# Patient Record
Sex: FEMALE | Employment: FULL TIME | URBAN - METROPOLITAN AREA
[De-identification: names, ages, dates, MRNs, and addresses within clinical notes are randomized per-mention and may not be internally consistent; named-entity substitution may affect disease eponyms.]

---

## 2022-03-18 PROBLEM — Z01.419 ENCOUNTER FOR ANNUAL ROUTINE GYNECOLOGICAL EXAMINATION: Status: ACTIVE | Noted: 2020-09-09

## 2022-03-19 PROBLEM — N92.6 IRREGULAR MENSES: Status: ACTIVE | Noted: 2021-04-07

## 2022-03-20 PROBLEM — B00.9 HERPES SIMPLEX VIRUS TYPE 2 (HSV-2) INFECTION AFFECTING PREGNANCY IN FIRST TRIMESTER: Status: ACTIVE | Noted: 2020-02-26

## 2022-03-20 PROBLEM — O98.511 HERPES SIMPLEX VIRUS TYPE 2 (HSV-2) INFECTION AFFECTING PREGNANCY IN FIRST TRIMESTER: Status: ACTIVE | Noted: 2020-02-26

## 2022-03-20 PROBLEM — R87.810 ASCUS WITH POSITIVE HIGH RISK HPV CERVICAL: Status: ACTIVE | Noted: 2020-02-26

## 2022-03-20 PROBLEM — R87.610 ASCUS WITH POSITIVE HIGH RISK HPV CERVICAL: Status: ACTIVE | Noted: 2020-02-26

## 2022-06-16 ENCOUNTER — OFFICE VISIT (OUTPATIENT)
Dept: OBGYN CLINIC | Age: 32
End: 2022-06-16
Payer: COMMERCIAL

## 2022-06-16 VITALS
DIASTOLIC BLOOD PRESSURE: 68 MMHG | WEIGHT: 171 LBS | BODY MASS INDEX: 26.84 KG/M2 | SYSTOLIC BLOOD PRESSURE: 120 MMHG | HEIGHT: 67 IN

## 2022-06-16 DIAGNOSIS — N89.8 VAGINAL DISCHARGE: Primary | ICD-10-CM

## 2022-06-16 PROCEDURE — G8427 DOCREV CUR MEDS BY ELIG CLIN: HCPCS | Performed by: NURSE PRACTITIONER

## 2022-06-16 PROCEDURE — 1036F TOBACCO NON-USER: CPT | Performed by: NURSE PRACTITIONER

## 2022-06-16 PROCEDURE — 99213 OFFICE O/P EST LOW 20 MIN: CPT | Performed by: NURSE PRACTITIONER

## 2022-06-16 PROCEDURE — G8419 CALC BMI OUT NRM PARAM NOF/U: HCPCS | Performed by: NURSE PRACTITIONER

## 2022-06-16 RX ORDER — METRONIDAZOLE 500 MG/1
500 TABLET ORAL 2 TIMES DAILY
Qty: 14 TABLET | Refills: 0 | Status: SHIPPED | OUTPATIENT
Start: 2022-06-16 | End: 2022-06-23

## 2022-06-16 NOTE — PROGRESS NOTES
Sylvia Amador is a 27 y.o. [id number] who is here today with c/o vaginal discharge for the last 3 days. Started after intercourse. Denies odor. Some itching. No recent antibiotic usage. Denies change in hygiene products      Patient's last menstrual period was 2022 (approximate). Past Medical History:   Diagnosis Date    Abnormal Papanicolaou smear of cervix     Chlamydia     HSV-1 infection     Suspected fetal anomaly, antepartum, fetus 1 2020    acrania at 12 weeks       Past Surgical History:   Procedure Laterality Date     SECTION      DILATION AND EVACUATION OF UTERUS      GYN          LUMBAR DISCECTOMY         Family History   Problem Relation Age of Onset    Uterine Cancer Neg Hx     Colon Cancer Neg Hx     Ovarian Cancer Neg Hx     Breast Cancer Neg Hx        Social History     Socioeconomic History    Marital status: Single     Spouse name: Not on file    Number of children: Not on file    Years of education: Not on file    Highest education level: Not on file   Occupational History    Not on file   Tobacco Use    Smoking status: Never Smoker    Smokeless tobacco: Never Used   Substance and Sexual Activity    Alcohol use: No    Drug use: Never    Sexual activity: Yes     Birth control/protection: None   Other Topics Concern    Not on file   Social History Narrative    Abuse: Feels safe at home, no history of physical abuse, no history of sexual abuse       Social Determinants of Health     Financial Resource Strain:     Difficulty of Paying Living Expenses: Not on file   Food Insecurity:     Worried About Running Out of Food in the Last Year: Not on file    Pamela of Food in the Last Year: Not on file   Transportation Needs:     Lack of Transportation (Medical): Not on file    Lack of Transportation (Non-Medical):  Not on file   Physical Activity:     Days of Exercise per Week: Not on file    Minutes of Exercise per Session: Not on file   Stress:     Feeling of Stress : Not on file   Social Connections:     Frequency of Communication with Friends and Family: Not on file    Frequency of Social Gatherings with Friends and Family: Not on file    Attends Worship Services: Not on file    Active Member of Clubs or Organizations: Not on file    Attends Club or Organization Meetings: Not on file    Marital Status: Not on file   Intimate Partner Violence:     Fear of Current or Ex-Partner: Not on file    Emotionally Abused: Not on file    Physically Abused: Not on file    Sexually Abused: Not on file   Housing Stability:     Unable to Pay for Housing in the Last Year: Not on file    Number of [place] in the Last Year: Not on file    Unstable Housing in the Last Year: Not on file           Objective:    Vitals:    06/16/22 1141   BP: 120/68   Site: Left Upper Arm   Position: Sitting   Weight: 171 lb (77.6 kg)   Height: 5' 7\" (1.702 m)         Constitutional:  well-developed, well-nourished, and in no distress. Mental: Alert and awake. Oriented to person/place/time. Able to follow commands    Eyes: EOM nl, Sclera nl, Ocular Discharge not visualized    HENT: Normocephalic and atraumatic. Mouth/Throat: Mucous membranes are moist    External Ears: nl    Neck: Normal range of motion. No masses visualized       Pulmonary: Effort normal. No visualized signs of difficulty breathing or respiratory distress    Pelvic Exam:       External: normal female genitalia without lesions or masses       Vagina: normal without lesions or masses, gray frothy discharge noted      Cervix: normal without lesions or masses       Musculoskeletal: Normal range of motion. Normal gait with no signs of ataxia     Neurological: No Facial Asymmetry (Cranial nerve 7 motor function) No gaze palsy    Skin: No significant exanthematous lesions or discoloration noted on facial skin      Psych: Normal affect.  No hallucinations              Assessment/Plan            Patient Active Problem List    Diagnosis Date Noted    Vaginal discharge 06/16/2022     Priority: Medium     Assessment & Plan Note:     nuswab collected  Exam c/w BV, rx sent for flagyl  Will call with any abnormal results      Irregular menses 04/07/2021    Encounter for annual routine gynecological examination 09/09/2020    ASCUS with positive high risk HPV cervical 02/26/2020     Overview Note:     8/19/2019 ASCUS HPV+, plan repeat pap after delivery        Herpes simplex virus type 2 (HSV-2) infection affecting pregnancy in first trimester 02/26/2020     Overview Note:     2/26/2020: POS serum only, pt denies any hx of cold sores or genital   outbreaks           Problem List Items Addressed This Visit        Other    Vaginal discharge - Primary     nuswab collected  Exam c/w BV, rx sent for flagyl  Will call with any abnormal results         Relevant Orders    Nuswab Vaginitis Plus (VG+)          Orders Placed This Encounter   Procedures    Nuswab Vaginitis Plus (VG+)       Outpatient Encounter Medications as of 6/16/2022   Medication Sig Dispense Refill    metroNIDAZOLE (FLAGYL) 500 MG tablet Take 1 tablet by mouth 2 times daily for 7 days 14 tablet 0    etonogestrel-ethinyl estradiol (NUVARING) 0.12-0.015 MG/24HR vaginal ring Insert for 3 weeks each month (Patient not taking: Reported on 6/16/2022)       No facility-administered encounter medications on file as of 6/16/2022.                Grecia Ly NP, APRN - CNP 06/16/22 12:03 PM

## 2022-06-16 NOTE — PROGRESS NOTES
Patient comes in today for STD testing. Patient states she noticed vaginal discharge few days ago. LAST PAP:  04/25/2022, Negative HPV Negative    LAST MAMMO:  Never    LMP:  Patient's last menstrual period was 05/28/2022 (approximate).     BIRTH CONTROL:  none    TOBACCO USE:  No    FAMILY HISTORY OF:   Breast Cancer:  No   Ovarian Cancer:  No   Uterine Cancer:  No   Colon Cancer:  No    Vitals:    06/16/22 1141   BP: 120/68   Site: Left Upper Arm   Position: Sitting   Weight: 171 lb (77.6 kg)   Height: 5' 7\" (1.702 m)        Katt Holt MA  06/16/22  11:49 AM

## 2022-06-22 ENCOUNTER — TELEPHONE (OUTPATIENT)
Dept: OBGYN CLINIC | Age: 32
End: 2022-06-22

## 2022-06-22 LAB
A VAGINAE DNA VAG QL NAA+PROBE: ABNORMAL SCORE
BVAB2 DNA VAG QL NAA+PROBE: ABNORMAL SCORE
C ALBICANS DNA VAG QL NAA+PROBE: POSITIVE
C GLABRATA DNA VAG QL NAA+PROBE: NEGATIVE
C TRACH RRNA SPEC QL NAA+PROBE: NEGATIVE
MEGA1 DNA VAG QL NAA+PROBE: ABNORMAL SCORE
N GONORRHOEA RRNA SPEC QL NAA+PROBE: NEGATIVE
SPECIMEN SOURCE: ABNORMAL
T VAGINALIS RRNA SPEC QL NAA+PROBE: NEGATIVE

## 2022-06-22 RX ORDER — FLUCONAZOLE 150 MG/1
150 TABLET ORAL
Qty: 2 TABLET | Refills: 0 | Status: SHIPPED | OUTPATIENT
Start: 2022-06-22 | End: 2022-06-26

## 2022-06-22 NOTE — TELEPHONE ENCOUNTER
1. Patient positive for yeast infection. If having symptoms, can have one of the following for treatment, if not allergic    Diflucan 150 mg PO Take 1 tab and repeat in 3 days if needed, #2, No Refills (NOT TO BE PRESCRIBED IF PREGNANT)    OR    Terazol 7 Apply 1 applicator nightly for 7 nights, #7, No Refills        2.  Also + BV- treated during visit

## 2022-07-03 NOTE — PROGRESS NOTES
I have reviewed the patient's visit today including history, exam and assessment by ERUM Cerda. I agree with treatment/plan as above.     Romana Worley MD  10:04 AM  07/03/22

## 2023-05-24 RX ORDER — VALACYCLOVIR HYDROCHLORIDE 500 MG/1
TABLET, FILM COATED ORAL
Qty: 6 TABLET | Refills: 10 | OUTPATIENT
Start: 2023-05-24

## 2023-08-23 ENCOUNTER — TELEPHONE (OUTPATIENT)
Dept: OBGYN CLINIC | Age: 33
End: 2023-08-23

## 2023-08-23 RX ORDER — VALACYCLOVIR HYDROCHLORIDE 500 MG/1
TABLET, FILM COATED ORAL
Qty: 6 TABLET | Refills: 10 | OUTPATIENT
Start: 2023-08-23

## 2023-08-23 RX ORDER — VALACYCLOVIR HYDROCHLORIDE 500 MG/1
500 TABLET, FILM COATED ORAL 2 TIMES DAILY
Qty: 60 TABLET | Refills: 0 | Status: SHIPPED | OUTPATIENT
Start: 2023-08-23

## 2023-08-23 NOTE — TELEPHONE ENCOUNTER
Patient LM stating she needs a refill. I called patient to verify which medication she needs a refill on. Patient states she needs valtrex (we have not rx since 05/04/2022). Patient has not been seen since 06/2022. Patient states she is currently 10.5 weeks pregnant and will not be seeing us for her pregnancy. Patient states she will see someone in [place] on 09/07/2023. Patient states she has a current outbreak.

## 2023-08-23 NOTE — TELEPHONE ENCOUNTER
Rx sent for patient. Since she is pregnant, she needs to take BID until she has her new OB appointment. She can then discuss with them taking continuously vs episodic in the pregnancy.

## 2023-10-25 RX ORDER — VALACYCLOVIR HYDROCHLORIDE 500 MG/1
500 TABLET, FILM COATED ORAL 2 TIMES DAILY
Qty: 60 TABLET | Refills: 0 | OUTPATIENT
Start: 2023-10-25

## 2023-12-01 RX ORDER — VALACYCLOVIR HYDROCHLORIDE 500 MG/1
500 TABLET, FILM COATED ORAL 2 TIMES DAILY
Qty: 60 TABLET | Refills: 0 | OUTPATIENT
Start: 2023-12-01